# Patient Record
Sex: FEMALE | URBAN - METROPOLITAN AREA
[De-identification: names, ages, dates, MRNs, and addresses within clinical notes are randomized per-mention and may not be internally consistent; named-entity substitution may affect disease eponyms.]

---

## 2022-09-30 DIAGNOSIS — Z12.11 SCREENING FOR COLON CANCER: Primary | ICD-10-CM

## 2022-10-16 DIAGNOSIS — Z12.11 SCREENING FOR COLON CANCER: ICD-10-CM

## 2022-10-16 DIAGNOSIS — Z86.010 PERSONAL HISTORY OF COLONIC POLYPS: Primary | ICD-10-CM

## 2022-10-17 NOTE — TELEPHONE ENCOUNTER
Returned call to patient and l/m for her to call back to be scheduled for 5 yr repeat colonoscopy. APRIL

## 2022-10-17 NOTE — TELEPHONE ENCOUNTER
2017 colonoscopy with 2 TA. Repeat in 5 years.    Evaluate patient for direct scheduling colonoscopy.  NBP

## 2022-10-18 ENCOUNTER — TELEPHONE (OUTPATIENT)
Dept: GASTROENTEROLOGY | Facility: CLINIC | Age: 62
End: 2022-10-18
Payer: COMMERCIAL

## 2022-10-18 NOTE — TELEPHONE ENCOUNTER
Returned call again to patient and l/m for her to call back to get scheduled for 5 yr colonoscopy. APRIL

## 2022-10-18 NOTE — TELEPHONE ENCOUNTER
----- Message from Rachele Clemens sent at 10/18/2022  9:02 AM CDT -----  Contact: pt  Pt returning a missed call and can be reached at [phone].  Pt request a call back on Wednesday or Thrusday.  Pt at work and cannot answer the phone.      Thanks,        
Patient returning tristan call to get colonoscopy scheduled.  
no

## 2022-10-20 VITALS — HEIGHT: 65 IN | WEIGHT: 179 LBS | BODY MASS INDEX: 29.82 KG/M2

## 2022-10-20 RX ORDER — FERROUS SULFATE 325(65) MG
325 TABLET ORAL 2 TIMES DAILY
COMMUNITY
Start: 2022-09-28

## 2022-10-20 RX ORDER — HYDROCHLOROTHIAZIDE 12.5 MG/1
12.5 TABLET ORAL DAILY
COMMUNITY
Start: 2022-09-28

## 2022-10-20 RX ORDER — ESTRADIOL 0.5 MG/1
0.5 TABLET ORAL DAILY
COMMUNITY
Start: 2022-10-01

## 2022-10-20 RX ORDER — SOD SULF/POT CHLORIDE/MAG SULF 1.479 G
12 TABLET ORAL DAILY
Qty: 24 TABLET | Refills: 0 | Status: SHIPPED | OUTPATIENT
Start: 2022-10-20

## 2022-10-20 RX ORDER — AMLODIPINE BESYLATE 10 MG/1
10 TABLET ORAL DAILY
COMMUNITY
Start: 2022-09-28

## 2022-10-20 NOTE — TELEPHONE ENCOUNTER
Reached out to patient and got chart updated and scheduled for procedure and she voiced understood instructions, will  instructions. APRIL

## 2022-10-21 NOTE — TELEPHONE ENCOUNTER
Orders reviewed and signed.  Hypertension and high blood pressure are the same Dx. Remove one before sending to facility.  NBP

## 2023-01-11 ENCOUNTER — OUTSIDE PLACE OF SERVICE (OUTPATIENT)
Dept: GASTROENTEROLOGY | Facility: CLINIC | Age: 63
End: 2023-01-11
Payer: COMMERCIAL

## 2023-01-11 LAB — CRC RECOMMENDATION EXT: NORMAL

## 2023-01-11 PROCEDURE — 45380 PR COLONOSCOPY,BIOPSY: ICD-10-PCS | Mod: 59,,, | Performed by: INTERNAL MEDICINE

## 2023-01-11 PROCEDURE — 45385 PR COLONOSCOPY,REMV LESN,SNARE: ICD-10-PCS | Mod: 33,,, | Performed by: INTERNAL MEDICINE

## 2023-01-11 PROCEDURE — 45385 COLONOSCOPY W/LESION REMOVAL: CPT | Mod: 33,,, | Performed by: INTERNAL MEDICINE

## 2023-01-11 PROCEDURE — 45380 COLONOSCOPY AND BIOPSY: CPT | Mod: 59,,, | Performed by: INTERNAL MEDICINE

## 2023-01-18 ENCOUNTER — TELEPHONE (OUTPATIENT)
Dept: GASTROENTEROLOGY | Facility: CLINIC | Age: 63
End: 2023-01-18
Payer: COMMERCIAL

## 2023-01-19 NOTE — TELEPHONE ENCOUNTER
1 TA, 3 hyp, repeat colonoscopy in 3 years. Tortuous, angulated, redundant colon. Significant pelvic adhesions. Supine and head down helped.  Notify patient. Her colon polyps were benign. Repeat colonoscopy in 3 years.  Update in Health Maintenance section of Epic.  NBP

## 2023-02-07 ENCOUNTER — DOCUMENTATION ONLY (OUTPATIENT)
Dept: GASTROENTEROLOGY | Facility: CLINIC | Age: 63
End: 2023-02-07
Payer: COMMERCIAL